# Patient Record
Sex: FEMALE | Race: WHITE | NOT HISPANIC OR LATINO | Employment: OTHER | URBAN - METROPOLITAN AREA
[De-identification: names, ages, dates, MRNs, and addresses within clinical notes are randomized per-mention and may not be internally consistent; named-entity substitution may affect disease eponyms.]

---

## 2024-09-08 ENCOUNTER — LAB REQUISITION (OUTPATIENT)
Dept: LAB | Facility: HOSPITAL | Age: 89
End: 2024-09-08
Payer: MEDICARE

## 2024-09-08 PROCEDURE — 87086 URINE CULTURE/COLONY COUNT: CPT

## 2024-09-10 LAB — BACTERIA UR CULT: NORMAL

## 2024-09-11 ENCOUNTER — APPOINTMENT (OUTPATIENT)
Dept: RADIOLOGY | Facility: HOSPITAL | Age: 89
End: 2024-09-11
Payer: MEDICARE

## 2024-09-11 ENCOUNTER — HOSPITAL ENCOUNTER (EMERGENCY)
Facility: HOSPITAL | Age: 89
Discharge: HOME | End: 2024-09-11
Attending: GENERAL PRACTICE
Payer: MEDICARE

## 2024-09-11 ENCOUNTER — APPOINTMENT (OUTPATIENT)
Dept: CARDIOLOGY | Facility: HOSPITAL | Age: 89
End: 2024-09-11
Payer: MEDICARE

## 2024-09-11 VITALS
TEMPERATURE: 97.6 F | OXYGEN SATURATION: 100 % | DIASTOLIC BLOOD PRESSURE: 62 MMHG | RESPIRATION RATE: 16 BRPM | BODY MASS INDEX: 21.26 KG/M2 | HEART RATE: 61 BPM | WEIGHT: 120 LBS | HEIGHT: 63 IN | SYSTOLIC BLOOD PRESSURE: 190 MMHG

## 2024-09-11 DIAGNOSIS — N39.0 UTI (URINARY TRACT INFECTION), UNCOMPLICATED: ICD-10-CM

## 2024-09-11 DIAGNOSIS — S39.012A LUMBAR STRAIN, INITIAL ENCOUNTER: Primary | ICD-10-CM

## 2024-09-11 LAB
ALBUMIN SERPL BCP-MCNC: 4.3 G/DL (ref 3.4–5)
ALP SERPL-CCNC: 61 U/L (ref 33–136)
ALT SERPL W P-5'-P-CCNC: 13 U/L (ref 7–45)
ANION GAP SERPL CALC-SCNC: 12 MMOL/L (ref 10–20)
APPEARANCE UR: CLEAR
AST SERPL W P-5'-P-CCNC: 23 U/L (ref 9–39)
BASOPHILS # BLD AUTO: 0.08 X10*3/UL (ref 0–0.1)
BASOPHILS NFR BLD AUTO: 0.9 %
BILIRUB SERPL-MCNC: 0.8 MG/DL (ref 0–1.2)
BILIRUB UR STRIP.AUTO-MCNC: NEGATIVE MG/DL
BUN SERPL-MCNC: 23 MG/DL (ref 6–23)
CALCIUM SERPL-MCNC: 9.8 MG/DL (ref 8.6–10.3)
CHLORIDE SERPL-SCNC: 104 MMOL/L (ref 98–107)
CO2 SERPL-SCNC: 27 MMOL/L (ref 21–32)
COLOR UR: ABNORMAL
CREAT SERPL-MCNC: 0.67 MG/DL (ref 0.5–1.05)
EGFRCR SERPLBLD CKD-EPI 2021: 81 ML/MIN/1.73M*2
EOSINOPHIL # BLD AUTO: 0.06 X10*3/UL (ref 0–0.4)
EOSINOPHIL NFR BLD AUTO: 0.7 %
ERYTHROCYTE [DISTWIDTH] IN BLOOD BY AUTOMATED COUNT: 16.6 % (ref 11.5–14.5)
GLUCOSE SERPL-MCNC: 99 MG/DL (ref 74–99)
GLUCOSE UR STRIP.AUTO-MCNC: NORMAL MG/DL
HCT VFR BLD AUTO: 41.9 % (ref 36–46)
HGB BLD-MCNC: 13.2 G/DL (ref 12–16)
IMM GRANULOCYTES # BLD AUTO: 0.02 X10*3/UL (ref 0–0.5)
IMM GRANULOCYTES NFR BLD AUTO: 0.2 % (ref 0–0.9)
KETONES UR STRIP.AUTO-MCNC: NEGATIVE MG/DL
LEUKOCYTE ESTERASE UR QL STRIP.AUTO: ABNORMAL
LYMPHOCYTES # BLD AUTO: 2.45 X10*3/UL (ref 0.8–3)
LYMPHOCYTES NFR BLD AUTO: 29 %
MCH RBC QN AUTO: 27.8 PG (ref 26–34)
MCHC RBC AUTO-ENTMCNC: 31.5 G/DL (ref 32–36)
MCV RBC AUTO: 88 FL (ref 80–100)
MONOCYTES # BLD AUTO: 0.62 X10*3/UL (ref 0.05–0.8)
MONOCYTES NFR BLD AUTO: 7.3 %
MUCOUS THREADS #/AREA URNS AUTO: ABNORMAL /LPF
NEUTROPHILS # BLD AUTO: 5.22 X10*3/UL (ref 1.6–5.5)
NEUTROPHILS NFR BLD AUTO: 61.9 %
NITRITE UR QL STRIP.AUTO: NEGATIVE
NRBC BLD-RTO: 0 /100 WBCS (ref 0–0)
PH UR STRIP.AUTO: 6 [PH]
PLATELET # BLD AUTO: 224 X10*3/UL (ref 150–450)
POTASSIUM SERPL-SCNC: 4.1 MMOL/L (ref 3.5–5.3)
PROT SERPL-MCNC: 7.1 G/DL (ref 6.4–8.2)
PROT UR STRIP.AUTO-MCNC: ABNORMAL MG/DL
RBC # BLD AUTO: 4.74 X10*6/UL (ref 4–5.2)
RBC # UR STRIP.AUTO: NEGATIVE /UL
RBC #/AREA URNS AUTO: ABNORMAL /HPF
SODIUM SERPL-SCNC: 139 MMOL/L (ref 136–145)
SP GR UR STRIP.AUTO: >1.05
SQUAMOUS #/AREA URNS AUTO: ABNORMAL /HPF
UROBILINOGEN UR STRIP.AUTO-MCNC: NORMAL MG/DL
WBC # BLD AUTO: 8.5 X10*3/UL (ref 4.4–11.3)
WBC #/AREA URNS AUTO: ABNORMAL /HPF

## 2024-09-11 PROCEDURE — 87086 URINE CULTURE/COLONY COUNT: CPT | Mod: AHULAB | Performed by: GENERAL PRACTICE

## 2024-09-11 PROCEDURE — 71275 CT ANGIOGRAPHY CHEST: CPT | Performed by: RADIOLOGY

## 2024-09-11 PROCEDURE — 71275 CT ANGIOGRAPHY CHEST: CPT

## 2024-09-11 PROCEDURE — 2500000005 HC RX 250 GENERAL PHARMACY W/O HCPCS

## 2024-09-11 PROCEDURE — 71046 X-RAY EXAM CHEST 2 VIEWS: CPT

## 2024-09-11 PROCEDURE — 93005 ELECTROCARDIOGRAM TRACING: CPT

## 2024-09-11 PROCEDURE — 74174 CTA ABD&PLVS W/CONTRAST: CPT | Performed by: RADIOLOGY

## 2024-09-11 PROCEDURE — 2550000001 HC RX 255 CONTRASTS: Performed by: GENERAL PRACTICE

## 2024-09-11 PROCEDURE — 85025 COMPLETE CBC W/AUTO DIFF WBC: CPT

## 2024-09-11 PROCEDURE — 71046 X-RAY EXAM CHEST 2 VIEWS: CPT | Performed by: RADIOLOGY

## 2024-09-11 PROCEDURE — 80053 COMPREHEN METABOLIC PANEL: CPT

## 2024-09-11 PROCEDURE — 36415 COLL VENOUS BLD VENIPUNCTURE: CPT

## 2024-09-11 PROCEDURE — 96374 THER/PROPH/DIAG INJ IV PUSH: CPT

## 2024-09-11 PROCEDURE — 99285 EMERGENCY DEPT VISIT HI MDM: CPT | Mod: 25

## 2024-09-11 PROCEDURE — 81001 URINALYSIS AUTO W/SCOPE: CPT | Performed by: GENERAL PRACTICE

## 2024-09-11 PROCEDURE — 2500000001 HC RX 250 WO HCPCS SELF ADMINISTERED DRUGS (ALT 637 FOR MEDICARE OP): Performed by: GENERAL PRACTICE

## 2024-09-11 PROCEDURE — 2500000004 HC RX 250 GENERAL PHARMACY W/ HCPCS (ALT 636 FOR OP/ED): Performed by: GENERAL PRACTICE

## 2024-09-11 RX ORDER — CEPHALEXIN 500 MG/1
500 CAPSULE ORAL ONCE
Status: COMPLETED | OUTPATIENT
Start: 2024-09-11 | End: 2024-09-11

## 2024-09-11 RX ORDER — CEPHALEXIN 500 MG/1
500 CAPSULE ORAL 2 TIMES DAILY
Qty: 14 CAPSULE | Refills: 0 | Status: SHIPPED | OUTPATIENT
Start: 2024-09-11 | End: 2024-09-18

## 2024-09-11 RX ORDER — LIDOCAINE 560 MG/1
2 PATCH PERCUTANEOUS; TOPICAL; TRANSDERMAL ONCE
Status: DISCONTINUED | OUTPATIENT
Start: 2024-09-11 | End: 2024-09-12 | Stop reason: HOSPADM

## 2024-09-11 RX ORDER — CYCLOBENZAPRINE HCL 10 MG
10 TABLET ORAL ONCE
Status: COMPLETED | OUTPATIENT
Start: 2024-09-11 | End: 2024-09-11

## 2024-09-11 RX ORDER — CYCLOBENZAPRINE HCL 10 MG
10 TABLET ORAL 2 TIMES DAILY PRN
Qty: 10 TABLET | Refills: 0 | Status: SHIPPED | OUTPATIENT
Start: 2024-09-11 | End: 2024-09-16

## 2024-09-11 RX ORDER — FENTANYL CITRATE 50 UG/ML
50 INJECTION, SOLUTION INTRAMUSCULAR; INTRAVENOUS ONCE
Status: COMPLETED | OUTPATIENT
Start: 2024-09-11 | End: 2024-09-11

## 2024-09-11 RX ORDER — LIDOCAINE 50 MG/G
1 PATCH TOPICAL DAILY
Qty: 5 PATCH | Refills: 0 | Status: SHIPPED | OUTPATIENT
Start: 2024-09-11

## 2024-09-11 RX ORDER — ACETAMINOPHEN 325 MG/1
975 TABLET ORAL ONCE
Status: COMPLETED | OUTPATIENT
Start: 2024-09-11 | End: 2024-09-11

## 2024-09-11 ASSESSMENT — PAIN DESCRIPTION - LOCATION: LOCATION: BACK

## 2024-09-11 ASSESSMENT — PAIN - FUNCTIONAL ASSESSMENT
PAIN_FUNCTIONAL_ASSESSMENT: 0-10
PAIN_FUNCTIONAL_ASSESSMENT: 0-10

## 2024-09-11 ASSESSMENT — COLUMBIA-SUICIDE SEVERITY RATING SCALE - C-SSRS
6. HAVE YOU EVER DONE ANYTHING, STARTED TO DO ANYTHING, OR PREPARED TO DO ANYTHING TO END YOUR LIFE?: NO
1. IN THE PAST MONTH, HAVE YOU WISHED YOU WERE DEAD OR WISHED YOU COULD GO TO SLEEP AND NOT WAKE UP?: NO
2. HAVE YOU ACTUALLY HAD ANY THOUGHTS OF KILLING YOURSELF?: NO

## 2024-09-11 ASSESSMENT — PAIN DESCRIPTION - PAIN TYPE: TYPE: ACUTE PAIN

## 2024-09-11 ASSESSMENT — PAIN SCALES - GENERAL
PAINLEVEL_OUTOF10: 10 - WORST POSSIBLE PAIN
PAINLEVEL_OUTOF10: 9

## 2024-09-11 NOTE — ED TRIAGE NOTES
Pt. Endorses upper back pain/ spasm pain that does not radiate. Patient denies CP, SOB. Bowel and bladder issues. Hx HTN

## 2024-09-11 NOTE — ED TRIAGE NOTES
TRIAGE NOTE   I saw the patient as the Clinician in Triage and performed a brief history and physical exam, established acuity, and ordered appropriate tests to develop basic plan of care. Patient will be seen by an [facility], resident and/or physician who will independently evaluate the patient. Please see subsequent provider notes for further details and disposition.     Brief HPI: In brief, Shima Morton is a 89 y.o. female that presents for right lower thoracic back pain for a few days.  Reports she thinks it is a muscle spasm pain.  States she has had this pain before, states at 1 time she was given a shot which she thinks was ibuprofen which helped, but states that she went to an urgent care and had this medication which did not improve her pain.  Does not radiate.  No chest pain or dyspnea.  No nausea or vomiting.  No cough.  History of hypertension.    Focused Physical exam:   Hypertensive in triage at 182/79.  Well-appearing, alert and oriented, no acute distress.  Tenderness over the right lower thoracic paraspinal musculature.  Heart regular rate and rhythm.    Plan/MDM:   Workup initiated including basic labs, EKG, chest x-ray, Tylenol, lidocaine patches.  Please see subsequent provider note for further details and disposition.    As provider-in-triage, I performed a medical screening history and physical exam on this patient. For the remainder of the patient's workup and ED course, please see the main ED provider note.  I evaluated this patient in triage with the RN. Due to the patient's complaint, labs, imaging, and/or interventions were ordered by me in an attempt to expedite/facilitate patient care, however I am not participating in care after evaluation. This is a preliminary assessment. Patient does not appear in acute distress at this time. They are stable and will have a full evaluation as soon as possible. They will be cared for by another provider who will possibly order more labs, imaging  and/or interventions. Patient did not have a full ROS or PE completed by myself, however below is a summary with reasons for orders.  Patient to be reevaluated once in formal ED bed.

## 2024-09-13 LAB — BACTERIA UR CULT: NORMAL

## 2024-09-14 LAB
ATRIAL RATE: 56 BPM
P AXIS: 97 DEGREES
P OFFSET: 131 MS
P ONSET: 111 MS
PR INTERVAL: 216 MS
Q ONSET: 219 MS
QRS COUNT: 10 BEATS
QRS DURATION: 76 MS
QT INTERVAL: 444 MS
QTC CALCULATION(BAZETT): 428 MS
QTC FREDERICIA: 434 MS
R AXIS: 51 DEGREES
T AXIS: 79 DEGREES
T OFFSET: 441 MS
VENTRICULAR RATE: 56 BPM

## 2024-09-16 NOTE — ED PROVIDER NOTES
HPI   Chief Complaint   Patient presents with    Back Pain       HPI: 89-year-old female presents with right flank/lumbar pain.  She reports that she has had this pain in the past and it has been attributed to a musculoskeletal strain.  Her symptoms have been present for the past 3 days and have been refractory to over-the-counter medications.  She denies fever, chills, dysuria, hematuria, nausea and vomiting.  The pain is made worse with movement and is nonradiating.      Limitations to history: None  Independent Historians: Patient  External Records Reviewed: HIE, outpatient notes, inpatient notes  ------------------------------------------------------------------------------------------------------------------------------------------  ROS: a ten point review of systems was performed and was negative except as per HPI.  ------------------------------------------------------------------------------------------------------------------------------------------  PMH / PSH: as per HPI, otherwise reviewed in EMR  MEDS: as per HPI, otherwise reviewed in EMR  ALLERGIES: as per HPI, otherwise reviewed in EMR  SocH:  as per HPI, otherwise reviewed in EMR  FH:  as per HPI, otherwise reviewed in EMR  ------------------------------------------------------------------------------------------------------------------------------------------  Physical Exam:  VS: As documented in the triage note and EMR flowsheet from this visit was reviewed  General: Well appearing. No acute distress.   Eyes:  Extraocular movements grossly intact. No scleral icterus. No discharge  HEENT:  Normocephalic.  Atraumatic  Neck: Moves neck freely. No gross masses  CV: Regular rhythm. No murmurs, rubs or gallops   Resp: Clear to auscultation bilaterally. No respiratory distress.    GI: Soft, no masses, nontender. No rebound tenderness or guarding  MSK: Symmetric muscle bulk. No deformities. No lower extremity edema.  Mild tenderness to palpation over the  right flank and lumbar area  Skin: Warm, dry, intact.   Neuro: No focal deficits.  A&O x3.   Psych: Appropriate for situation  ------------------------------------------------------------------------------------------------------------------------------------------  Hospital Course / Medical Decision Making:  Independent Interpretations: CTA chest, abdomen and pelvis  EKG as interpreted by me: Sinus bradycardia 56 bpm with a first-degree AV block with a KS interval of 260 ms with no signs of acute ischemia    MDM: 89-year-old female presents with right flank/lumbar pain.  She is afebrile and well-appearing. In the ED she was given Tylenol, fentanyl, cyclobenzaprine and a lidocaine patch.  She reported significant relief on reevaluation.  No aortic dissection noted on CTA.  There is moderate wall calcification of the thoracic aorta with no aneurysm or dissection.  The patient was provided with a copy of her imaging report.  She does have a UTI was given oral Keflex in the ED.  She was provided with a prescription for Keflex, lidocaine patches and cyclobenzaprine.  She will follow-up with her primary care physician as soon as possible regarding these findings and will return to the ER for any concerns.    Discussion of Management with Other Providers:   I discussed the patient/results with: Emergency medicine team    Final diagnosis and disposition as below.    Results for orders placed or performed during the hospital encounter of 09/11/24  -Urine Culture:   Specimen: Clean Catch/Voided; Urine       Result                      Value             Ref Range           Urine Culture                                                 No significant growth  -CBC and Auto Differential:        Result                      Value             Ref Range           WBC                         8.5               4.4 - 11.3 x*       nRBC                        0.0               0.0 - 0.0 /1*       RBC                         4.74               4.00 - 5.20 *       Hemoglobin                  13.2              12.0 - 16.0 *       Hematocrit                  41.9              36.0 - 46.0 %       MCV                         88                80 - 100 fL         MCH                         27.8              26.0 - 34.0 *       MCHC                        31.5 (L)          32.0 - 36.0 *       RDW                         16.6 (H)          11.5 - 14.5 %       Platelets                   224               150 - 450 x1*       Neutrophils %               61.9              40.0 - 80.0 %       Immature Granulocytes *     0.2               0.0 - 0.9 %         Lymphocytes %               29.0              13.0 - 44.0 %       Monocytes %                 7.3               2.0 - 10.0 %        Eosinophils %               0.7               0.0 - 6.0 %         Basophils %                 0.9               0.0 - 2.0 %         Neutrophils Absolute        5.22              1.60 - 5.50 *       Immature Granulocytes *     0.02              0.00 - 0.50 *       Lymphocytes Absolute        2.45              0.80 - 3.00 *       Monocytes Absolute          0.62              0.05 - 0.80 *       Eosinophils Absolute        0.06              0.00 - 0.40 *       Basophils Absolute          0.08              0.00 - 0.10 *  -Comprehensive metabolic panel:        Result                      Value             Ref Range           Glucose                     99                74 - 99 mg/dL       Sodium                      139               136 - 145 mm*       Potassium                   4.1               3.5 - 5.3 mm*       Chloride                    104               98 - 107 mmo*       Bicarbonate                 27                21 - 32 mmol*       Anion Gap                   12                10 - 20 mmol*       Urea Nitrogen               23                6 - 23 mg/dL        Creatinine                  0.67              0.50 - 1.05 *       eGFR                        81                 >60 mL/min/1*       Calcium                     9.8               8.6 - 10.3 m*       Albumin                     4.3               3.4 - 5.0 g/*       Alkaline Phosphatase        61                33 - 136 U/L        Total Protein               7.1               6.4 - 8.2 g/*       AST                         23                9 - 39 U/L          Bilirubin, Total            0.8               0.0 - 1.2 mg*       ALT                         13                7 - 45 U/L     -Urinalysis with Reflex Culture and Microscopic:        Result                      Value             Ref Range           Color, Urine                Light-Yellow      Light-Yellow*       Appearance, Urine           Clear             Clear               Specific Gravity, Urine     >1.050 (N)        1.005 - 1.035       pH, Urine                   6.0               5.0, 5.5, 6.*       Protein, Urine              20 (TRACE)        NEGATIVE, 10*       Glucose, Urine              Normal            Normal mg/dL        Blood, Urine                NEGATIVE          NEGATIVE            Ketones, Urine              NEGATIVE          NEGATIVE mg/*       Bilirubin, Urine            NEGATIVE          NEGATIVE            Urobilinogen, Urine         Normal            Normal mg/dL        Nitrite, Urine              NEGATIVE          NEGATIVE            Leukocyte Esterase, Ur*     75 Raghavendra/µL (A)     NEGATIVE       -Microscopic Only, Urine:        Result                      Value             Ref Range           WBC, Urine                  11-20 (A)         1-5, NONE /H*       RBC, Urine                  3-5               NONE, 1-2, 3*       Squamous Epithelial Ce*     1-9 (SPARSE)      Reference ra*       Mucus, Urine                FEW               Reference ra*  -ECG 12 lead:        Result                      Value             Ref Range           Ventricular Rate            56                BPM                 Atrial Rate                 56                BPM                  HI Interval                 216               ms                  QRS Duration                76                ms                  QT Interval                 444               ms                  QTC Calculation(Bazett)     428               ms                  P Axis                      97                degrees             R Axis                      51                degrees             T Axis                      79                degrees             QRS Count                   10                beats               Q Onset                     219               ms                  P Onset                     111               ms                  P Offset                    131               ms                  T Offset                    441               ms                  QTC Fredericia              434               ms             CT angio chest abdomen pelvis   Final Result    CHEST    1.  Moderate wall calcification of the thoracic aorta and its    branches including coronary arteries. No aneurysm or dissection is    seen.    2. Tiny right lung nodules, nonspecific in etiology. Follow-up per    Fleischner is criteria recommended.    3. Additional detailed findings as above.                      ABDOMEN - PELVIS    1.  Heavy diffuse wall calcification of the aorta and its main    branches. No aneurysm or dissection. Findings are worse over the left    common iliac artery. No aortic branch occlusion identified at the    time of this exam.    2. Colonic diverticulosis but no CT evidence for acute diverticulitis.    3. Small hiatal hernia.    4. Additional detailed findings as above.                MACRO:    None          Signed by: Richard Mobley 9/11/2024 8:14 PM    Dictation workstation:   [id number]     XR chest 2 views   Final Result    1.  No evidence of acute cardiopulmonary process.                      MACRO:    None.          Signed by: Naif Dyer 9/11/2024 4:10 PM    Dictation  workstation:   [id number]                   Patient History   No past medical history on file.  No past surgical history on file.  No family history on file.  Social History     Tobacco Use    Smoking status: Not on file    Smokeless tobacco: Not on file   Substance Use Topics    Alcohol use: Not on file    Drug use: Not on file       Physical Exam   ED Triage Vitals   Temperature Heart Rate Respirations BP   09/11/24 1256 09/11/24 1256 09/11/24 1256 09/11/24 1256   36.4 °C (97.5 °F) 57 15 (!) 182/79      Pulse Ox Temp Source Heart Rate Source Patient Position   09/11/24 1256 09/11/24 1256 09/11/24 1530 09/11/24 1753   100 % Oral Monitor Sitting      BP Location FiO2 (%)     09/11/24 1530 --     Right arm        Physical Exam      ED Course & MDM   Diagnoses as of 09/15/24 2107   Lumbar strain, initial encounter   UTI (urinary tract infection), uncomplicated                 No data recorded     Jessica Coma Scale Score: 15 (09/11/24 1915 : Alie Souza RN)                           Medical Decision Making      Procedure  Procedures     Gael Villela,   09/15/24 2111